# Patient Record
Sex: MALE | Race: BLACK OR AFRICAN AMERICAN | Employment: FULL TIME | URBAN - METROPOLITAN AREA
[De-identification: names, ages, dates, MRNs, and addresses within clinical notes are randomized per-mention and may not be internally consistent; named-entity substitution may affect disease eponyms.]

---

## 2021-02-22 ENCOUNTER — HOSPITAL ENCOUNTER (OUTPATIENT)
Dept: PHYSICAL THERAPY | Age: 41
Discharge: HOME | End: 2021-02-22
Payer: COMMERCIAL

## 2021-02-22 PROCEDURE — 97162 PT EVAL MOD COMPLEX 30 MIN: CPT | Performed by: PHYSICAL THERAPIST

## 2021-02-22 PROCEDURE — 97530 THERAPEUTIC ACTIVITIES: CPT | Performed by: PHYSICAL THERAPIST

## 2021-02-22 PROCEDURE — 97110 THERAPEUTIC EXERCISES: CPT | Performed by: PHYSICAL THERAPIST

## 2021-02-22 NOTE — PROGRESS NOTES
[facility]  [address] [facility], [address] [facility], [address] [address],[place] [address]  [phone] fax      Plan of Care/ Statement of Necessity for Physical Therapy Services    Patient name: Cesario Dancer Start of Care: 2021   Referral source: LILLIAM Cole : 1980    Medical Diagnosis: Pain in right knee [M25.561]  Payor: BLUE CROSS / Plan:  [facility] / Product Type: PPO /  Onset Date: 2020    Treatment Diagnosis: R knee pain   Prior Hospitalization: see medical history Provider#: [id number]   Medications: Verified on Patient summary List    Comorbidities: elevated BMI, HBP, history of pre-diabetic   Prior Level of Function: I with ADLs, walking for exercise and at work as much as desired, sitting for long periods, driving without difficulty     The Plan of Care and following information is based on the information from the initial evaluation. Assessment/ key information: Patient is a pleasant younger adult male with sub-acute onset of R knee subluxation and pain. Pain appears primarily nociceptive in nature and based on special testing is likely patella-femoral syndrome vs. Meniscal injury. Treatment will focus on graded activity exposure with progression to proper squat and dead lift form as well as floor transfers to facilitate pain free functional lifting, walking, and running as desired.    Evaluation Complexity History MEDIUM  Complexity : 1-2 comorbidities / personal factors will impact the outcome/ POC ; Examination LOW Complexity : 1-2 Standardized tests and measures addressing body structure, function, activity limitation and / or participation in recreation  ;Presentation LOW Complexity : Stable, uncomplicated  ;Clinical Decision Making MEDIUM Complexity : FOTO score of 26-74  Overall Complexity Rating: MEDIUM  Problem List: pain affecting function, decrease ROM, decrease strength, edema affecting function, impaired gait/ balance, decrease ADL/ functional abilitiies, decrease activity tolerance, decrease flexibility/ joint mobility and decrease transfer abilities   Treatment Plan may include any combination of the following: Therapeutic exercise, Therapeutic activities, Neuromuscular re-education, Physical agent/modality, Gait/balance training, Manual therapy, Patient education, Self Care training, Functional mobility training, Home safety training, Stair training and Other: PNE, HEP  Patient / Family readiness to learn indicated by: asking questions and trying to perform skills  Persons(s) to be included in education: patient (P)  Barriers to Learning/Limitations: None  Patient Goal (s): I want to be able to get up from the floor in a kneeling position without pain to play with my daughter.   Patient Self Reported Health Status: good  Rehabilitation Potential: good    Short Term Goals: To be accomplished in 4 weeks:  1. I with HEP   Eval: established  2. Patient will be able to perform a modified lunge 10 times without onset of symptoms. Eval: unable  3. Patient will be able to perform a full squat without symptoms onset 5 times   Eval: unable  4. Patient will be able to SLS on R LE for 30 secs or greater. Eval: 15 secs  Long Term Goals: To be accomplished in 8 weeks:  1. Patient will achieve predicted FOTO score of 89   Eval: 84  2. Patient will be able to squat and lift 85# from the ground 5 times without symptoms onset to facilitate lifting daughter. Eval: unable  3. Patient will be able to transfer to and from the ground without external assistance 3 times in a row without symptom onset. Eval: unable without pain    Frequency / Duration: Patient to be seen 1 times per week for 8 weeks.     Patient/ Caregiver education and instruction: Diagnosis, prognosis, self care, activity modification, exercises and other PNE, HEP   [x]  Plan of care has been reviewed with BALAJI Ha, PT 2/22/2021 9:15 AM  ________________________________________________________________________    I certify that the above Therapy Services are being furnished while the patient is under my care. I agree with the treatment plan and certify that this therapy is necessary.     [de-identified] Signature:____________Date:_________TIME:________     LILLIAM Ibanez  ** Signature, Date and Time must be completed for valid certification **      Please sign and return to [facility] [address] [place] [address], [address] [place] [address],[facility] [address]  [phone] fax

## 2021-02-22 NOTE — PROGRESS NOTES
PT DAILY TREATMENT NOTE     Patient Name: Mj Mishra  Date:2021  : 1980  [x]  Patient  Verified  Payor: BLUE CROSS / Plan: [facility] [place] / Product Type: PPO /    In time:9:10A  Out time:9:50A  Total Treatment Time (min): 40min  Visit #: 1 of 8    Medicare/BCBS Only   Total Timed Codes (min):  30 1:1 Treatment Time:  30       Treatment Area: Pain in right knee [M25.561]    SUBJECTIVE  Pain Level (0-10 scale): 2/10  Any medication changes, allergies to medications, adverse drug reactions, diagnosis change, or new procedure performed?: [x] No    [] Yes (see summary sheet for update)  Subjective functional status/changes:   [] No changes reported  Lives in 2 story home with 10 steps to second floor with one handrail; no BOBBI the home. Aggravating factors: sitting in chair with knee bent for long period of times, getting down onto floor in kneeling position, squatting down to get to his dryer, climbing stairs; popping periodically    Eases: Rest, straightening the knee, walking    Used to run on his treadmill a lot; walking around the neighborhood and walking on his first and second job. Spontaneous/sharp pain through knee but has gotten progressively better since January    OBJECTIVE    15 min [x]Eval                  []Re-Eval       15 min Therapeutic Exercise:  [x] See flow sheet :   Rationale: increase ROM and increase strength to improve the patients ability to A/ROM and decrease pain with movement. 15 min Therapeutic Activity:  [x]  See flow sheet :   Rationale: increase strength and improve coordination  to improve the patients ability to Tolerate basic ADLs and job-related tasks without pain.             With   [x] TE   [x] TA   [] neuro   [] other: Patient Education: [x] Review HEP    [x] Progressed/Changed HEP based on:   [x] positioning   [] body mechanics   [] transfers   [] heat/ice application    [] other:      Other Objective/Functional Measures:   30 Sec Sit to Stand Test: 12 of 10 reps    SLS:   L: 30 secs  R: 15 secs    MMT: 4-/5 throughout B LEs    Reflexes: hyper at 1+  Varus Test: negative bilaterally    Patellar Grind Barbara: positive on R  Valgus Test: negative  Lachman's: negative    Pain Level (0-10 scale) post treatment: 2/10    ASSESSMENT/Changes in Function: see POC    Patient will continue to benefit from skilled PT services to modify and progress therapeutic interventions, address functional mobility deficits, address ROM deficits, address strength deficits, analyze and address soft tissue restrictions, analyze and cue movement patterns, analyze and modify body mechanics/ergonomics, assess and modify postural abnormalities, address imbalance/dizziness and instruct in home and community integration to attain remaining goals. [x]  See Plan of Care  []  See progress note/recertification  []  See Discharge Summary         Progress towards goals / Updated goals:  See POC    PLAN  [x]  Upgrade activities as tolerated     []  Continue plan of care  []  Update interventions per flow sheet       []  Discharge due to:_  []  Other:_      Lakesha Salazar, PT 2/22/2021  9:16 AM    No future appointments.

## 2021-03-01 ENCOUNTER — HOSPITAL ENCOUNTER (OUTPATIENT)
Dept: PHYSICAL THERAPY | Age: 41
Discharge: HOME | End: 2021-03-01
Payer: COMMERCIAL

## 2021-03-01 PROCEDURE — 97530 THERAPEUTIC ACTIVITIES: CPT

## 2021-03-01 PROCEDURE — 97110 THERAPEUTIC EXERCISES: CPT

## 2021-03-01 PROCEDURE — 97112 NEUROMUSCULAR REEDUCATION: CPT

## 2021-03-01 NOTE — PROGRESS NOTES
PT DAILY TREATMENT NOTE     Patient Name: Italo Gonzalez  Date:3/1/2021  : 1980  [x]  Patient  Verified  Payor: BLUE CROSS [facility] / Product Type: PPO /    In time:857  Out time:942  Total Treatment Time (min): 45  Visit #: 2 of 8    Medicare/BCBS Only   Total Timed Codes (min):  45 1:1 Treatment Time:  45       Treatment Area: Pain in right knee [M25.561]    SUBJECTIVE  Pain Level (0-10 scale): 1  Any medication changes, allergies to medications, adverse drug reactions, diagnosis change, or new procedure performed?: [x] No    [] Yes (see summary sheet for update)  Subjective functional status/changes:   [] No changes reported  Patient reports his knee was feeling sore by Saturday but he got some rest over the weekend and it feels better this morning. OBJECTIVE      20 min Therapeutic Exercise:  [x] See flow sheet :   Rationale: increase ROM and increase strength to improve the patients ability to increase activity tolerance    10 min Therapeutic Activity:  [x]  See flow sheet : sit to stands, squatting mechanics   Rationale: increase ROM, increase strength and improve coordination  to improve the patients ability to perform transfers, squatting/lifting, and negotiate stairs with ease     15 min Neuromuscular Re-education:  [x]  See flow sheet : balance, quad re-ed   Rationale: improve coordination, improve balance and increase proprioception  to improve the patients ability to increase standing and walking tolerance with increased stability            With   [x] TE   [x] TA   [] neuro   [] other: Patient Education: [x] Review HEP    [] Progressed/Changed HEP based on:   [x] positioning   [x] body mechanics   [] transfers   [] heat/ice application    [] other:      Other Objective/Functional Measures: initiated exercises per flow sheet     Pain Level (0-10 scale) post treatment: 0    ASSESSMENT/Changes in Function: Initiated exercise program per POC.  Provided instruction and cuing for performance of exercises. Patient reports most difficulty with getting up from a squat position and going down stairs. He demonstrated mild instability in SLS biliaterally (right>left), requiring occasional use of // bars for support. He tolerated all exercises without increase of pain and reported no pain following session. Add weight to squats and sit to stand next session. Patient will continue to benefit from skilled PT services to modify and progress therapeutic interventions, address functional mobility deficits, address ROM deficits, address strength deficits, analyze and address soft tissue restrictions, analyze and cue movement patterns, analyze and modify body mechanics/ergonomics, assess and modify postural abnormalities, address imbalance/dizziness and instruct in home and community integration to attain remaining goals. []  See Plan of Care  []  See progress note/recertification  []  See Discharge Summary         Progress towards goals / Updated goals:  Short Term Goals: To be accomplished in 4 weeks:  1. I with HEP               Eval: established   Current: reports compliance 3/1  2. Patient will be able to perform a modified lunge 10 times without onset of symptoms. Eval: unable  3. Patient will be able to perform a full squat without symptoms onset 5 times               Eval: unable  4. Patient will be able to SLS on R LE for 30 secs or greater. Eval: 15 secs   Current: requires use of parallel bars for support 3/1  Long Term Goals: To be accomplished in 8 weeks:  1. Patient will achieve predicted FOTO score of 89               Eval: 84  2. Patient will be able to squat and lift 85# from the ground 5 times without symptoms onset to facilitate lifting daughter. Eval: unable  3. Patient will be able to transfer to and from the ground without external assistance 3 times in a row without symptom onset.                Eval: unable without pain    PLAN  [x]  Upgrade activities as tolerated     []  Continue plan of care  []  Update interventions per flow sheet       []  Discharge due to:_  []  Other:_      Kareem Coy PTA 3/1/2021  8:13 AM    Future Appointments   Date Time Provider Lauren Gamez   3/1/2021  9:00 AM Dina Warren PTA [facility]   3/8/2021  9:00 AM Soledad Longo, PT [facility]   3/15/2021  9:00 AM Lakesha Salazar, PT [facility]   3/22/2021  9:00 AM Lakesha Salazar, PT [facility]   3/29/2021  9:00 AM Lakesha Salazar, PT [facility]

## 2021-03-08 ENCOUNTER — HOSPITAL ENCOUNTER (OUTPATIENT)
Dept: PHYSICAL THERAPY | Age: 41
Discharge: HOME | End: 2021-03-08
Payer: COMMERCIAL

## 2021-03-08 PROCEDURE — 97530 THERAPEUTIC ACTIVITIES: CPT | Performed by: PHYSICAL THERAPIST

## 2021-03-08 PROCEDURE — 97110 THERAPEUTIC EXERCISES: CPT | Performed by: PHYSICAL THERAPIST

## 2021-03-08 PROCEDURE — 97112 NEUROMUSCULAR REEDUCATION: CPT | Performed by: PHYSICAL THERAPIST

## 2021-03-08 NOTE — PROGRESS NOTES
PT DAILY TREATMENT NOTE     Patient Name: Ita Esquivel  Date:3/8/2021  : 1980  [x]  Patient  Verified  Payor: BLUE CROSS / Plan: [address] [place] [address] [place] / Product Type: PPO /    In time:8:57A  Out time:9:55A  Total Treatment Time (min): 57min  Visit #: 3 of 8    Medicare/BCBS Only   Total Timed Codes (min):  47 1:1 Treatment Time:  45       Treatment Area: Pain in right knee [M25.561]    SUBJECTIVE  Pain Level (0-10 scale): 2/10  Any medication changes, allergies to medications, adverse drug reactions, diagnosis change, or new procedure performed?: [x] No    [] Yes (see summary sheet for update)  Subjective functional status/changes:   [] No changes reported  Patient states the stairs don't hurt as much but his knee is still popping and still having difficulty deep squatting and getting onto the floor. OBJECTIVE    Modality rationale: decrease edema and increase tissue extensibility to improve the patients ability to decrease exercise-induced muscle soreness.    Min Type Additional Details    [] Estim:  []Unatt       []IFC  []Premod                        []Other:  []w/ice   []w/heat  Position:  Location:    [] Estim: []Att    []TENS instruct  []NMES                    []Other:  []w/US   []w/ice   []w/heat  Position:  Location:    []  Traction: [] Cervical       []Lumbar                       [] Prone          []Supine                       []Intermittent   []Continuous Lbs:  [] before manual  [] after manual    []  Ultrasound: []Continuous   [] Pulsed                           []1MHz   []3MHz W/cm2:  Location:    []  Iontophoresis with dexamethasone         Location: [] Take home patch   [] In clinic   10 [x]  Ice     []  heat  []  Ice massage  []  Laser   []  Anodyne Position: long sit  Location: R knee    []  Laser with stim  []  Other:  Position:  Location:    []  Vasopneumatic Device Pressure:       [] lo [] med [] hi   Temperature: [] lo [] med [] hi   [] Skin assessment post-treatment: []intact []redness- no adverse reaction    []redness  adverse reaction:     15 min Therapeutic Exercise:  [x] See flow sheet :   Rationale: increase ROM and increase strength to improve the patients ability to A/ROM and decrease pain with movement. 16 min Therapeutic Activity:  [x]  See flow sheet :   Rationale: increase strength and improve coordination  to improve the patients ability to Tolerate basic ADLs and job-related tasks without pain. 16 min Neuromuscular Re-education:  [x]  See flow sheet :   Rationale: improve coordination, improve balance and increase proprioception  to improve the patients ability to perform activities with good form and proprioception with tactile and verbal cuing appropriately. With   [x] TE   [x] TA   [x] neuro   [] other: Patient Education: [x] Review HEP    [x] Progressed/Changed HEP based on:   [x] positioning   [] body mechanics   [] transfers   [] heat/ice application    [] other:      Other Objective/Functional Measures: positive squat test with elevated heels in the bottom     Pain Level (0-10 scale) post treatment: 2/10    ASSESSMENT/Changes in Function: Patient is progressing towards goals of increased activity tolerance and decreased pan with functional activities such as stairs. Continues to need verbal and tactile cuing for proper form and positioning especially with squats and lunges for being able to get on and off the floor. Patient will continue to benefit from skilled PT services to modify and progress therapeutic interventions, address functional mobility deficits, address ROM deficits, address strength deficits, analyze and address soft tissue restrictions, analyze and cue movement patterns, analyze and modify body mechanics/ergonomics, assess and modify postural abnormalities, address imbalance/dizziness and instruct in home and community integration to attain remaining goals.      [x]  See Plan of Care  []  See progress note/recertification  [] See Discharge Summary         Progress towards goals / Updated goals:  Short Term Goals: To be accomplished in 4 weeks:  1. I with HEP               Eval: established              Current: reports compliance 3/8/21  2. Patient will be able to perform a modified lunge 10 times without onset of symptoms.              Eval: unable   Current: MET 3/8/21  3. Patient will be able to perform a full squat without symptoms onset 5 times               Eval: unable   Current: able to do 3 before pain 3/8/21  4. Patient will be able to SLS on R LE for 30 secs or greater.               Eval: 15 secs              Current: requires use of parallel bars for support 3/1  Long Term Goals: To be accomplished in 8 weeks:  1. Patient will achieve predicted FOTO score of 89               Eval: 84  2. Patient will be able to squat and lift 85# from the ground 5 times without symptoms onset to facilitate lifting daughter.  Arthur Ramone: unable  3.  Patient will be able to transfer to and from the ground without external assistance 3 times in a row without symptom onset.               Eval: unable without pain    PLAN  [x]  Upgrade activities as tolerated     []  Continue plan of care  []  Update interventions per flow sheet       []  Discharge due to:_  []  Other:_      Karie Lacy, PT 3/8/2021  9:08 AM    Future Appointments   Date Time Provider Lauren Gamez   3/15/2021  9:00 AM Eladio Grey PT [facility]   3/22/2021  9:00 AM Lakesha Salazar PT [facility]   3/29/2021  9:00 AM Lakesha Salazar PT [facility]

## 2021-03-15 ENCOUNTER — HOSPITAL ENCOUNTER (OUTPATIENT)
Dept: PHYSICAL THERAPY | Age: 41
Discharge: HOME | End: 2021-03-15
Payer: COMMERCIAL

## 2021-03-15 PROCEDURE — 97110 THERAPEUTIC EXERCISES: CPT | Performed by: PHYSICAL THERAPIST

## 2021-03-15 PROCEDURE — 97112 NEUROMUSCULAR REEDUCATION: CPT | Performed by: PHYSICAL THERAPIST

## 2021-03-15 PROCEDURE — 97530 THERAPEUTIC ACTIVITIES: CPT | Performed by: PHYSICAL THERAPIST

## 2021-03-15 NOTE — PROGRESS NOTES
PT DAILY TREATMENT NOTE     Patient Name: Malachi Arroyo  Date:3/15/2021  : 1980  [x]  Patient  Verified  Payor: BLUE CROSS / Plan: [employer] [facility] / Product Type: PPO /    In time:9:00A  Out time:9:54A  Total Treatment Time (min): 54  Visit #: 4 of 8    Medicare/BCBS Only   Total Timed Codes (min):  54 1:1 Treatment Time:  48       Treatment Area: Pain in right knee [M25.561]    SUBJECTIVE  Pain Level (0-10 scale): 0/10  Any medication changes, allergies to medications, adverse drug reactions, diagnosis change, or new procedure performed?: [x] No    [] Yes (see summary sheet for update)  Subjective functional status/changes:   [] No changes reported  Patient states feeling 80% better, able to get down and move around on knees without popping or pain, although was very sore for 2 days after last session. Also noting that he doesn't need to mess with R knee cap for pain relief nearly as much - now its 2x/day vs. Every hour on the job. OBJECTIVE         Modality rationale: decrease edema and increase tissue extensibility to improve the patients ability to decrease exercise-induced muscle soreness. Min Type Additional Details      []? Estim:  []? Unatt       []? IFC  []? Premod                        []?Other:  []?w/ice   []?w/heat  Position:  Location:      []? Estim: []? Att    []? TENS instruct  []? NMES                    []?Other:  []?w/US   []?w/ice   []?w/heat  Position:  Location:      []? Traction: []? Cervical       []? Lumbar                       []? Prone          []? Supine                       []?Intermittent   []? Continuous Lbs:  []? before manual  []? after manual      []? Ultrasound: []? Continuous   []? Pulsed                           []? 1MHz   []? 3MHz W/cm2:  Location:      []? Iontophoresis with dexamethasone         Location: []? Take home patch   []? In clinic    10 [x]? Ice in conjunction with patient education and goal review    []?  heat  []? Ice massage  []?   Laser []?  Anodyne Position: long sit  Location: R knee      []? Laser with stim  []? Other:  Position:  Location:     []? Vasopneumatic Device Pressure:       []? lo []? med []? hi   Temperature: []? lo []? med []? hi    []? Skin assessment post-treatment:  []?intact []? redness- no adverse reaction    []? redness  adverse reaction:      15 min Therapeutic Exercise:  [x]? See flow sheet :   Rationale: increase ROM and increase strength to improve the patients ability to A/ROM and decrease pain with movement.     28 min Therapeutic Activity:  [x]? See flow sheet :   Rationale: increase strength and improve coordination  to improve the patients ability to Tolerate basic ADLs and job-related tasks without pain. also included goal review and patient education. 11 min Neuromuscular Re-education:  [x]? See flow sheet :   Rationale: improve coordination, improve balance and increase proprioception  to improve the patients ability to perform activities with good form and proprioception with tactile and verbal cuing appropriately.     With   [x]? TE   [x]? TA   [x]? neuro   []? other: Patient Education: [x]? Review HEP    [x]? Progressed/Changed HEP based on:   [x]? positioning   []? body mechanics   []? transfers   []? heat/ice application    []? other:       Other Objective/Functional Measures: positive squat test with elevated heels in the bottom         Pain Level (0-10 scale) post treatment: 1/10     ASSESSMENT/Changes in Function: Patient is continuing to make steady progress towards goals of increased activity tolerance and decreased pan with functional activities such as stairs.  Continues to need verbal and tactile cuing for proper form and positioning especially with squats and lunges without pain or popping.      Patient will continue to benefit from skilled PT services to modify and progress therapeutic interventions, address functional mobility deficits, address ROM deficits, address strength deficits, analyze and address soft tissue restrictions, analyze and cue movement patterns, analyze and modify body mechanics/ergonomics, assess and modify postural abnormalities, address imbalance/dizziness and instruct in home and community integration to attain remaining goals. [x]? See Plan of Care  []? See progress note/recertification  []? See Discharge Summary         Progress towards goals / Updated goals:  Short Term Goals: To be accomplished in 4 weeks:  1. I with HEP               Eval: established              NGUXKOS: reports compliance 3/8/21 MET 3/15/21  2. Patient will be able to perform a modified lunge 10 times without onset of symptoms.              Eval: unable              Current: MET 3/8/21  3. Patient will be able to perform a full squat without symptoms onset 5 times               Eval: unable              Current: Able to do full squats 10 times 3/15/21  4. Patient will be able to SLS on R LE for 30 secs or greater.               Eval: 15 secs              Current: requires use of parallel bars for support 3/1  Long Term Goals: To be accomplished in 8 weeks:  1. Patient will achieve predicted FOTO score of 89               Eval: 84  2. Patient will be able to squat and lift 85# from the ground 5 times without symptoms onset to facilitate lifting daughterRina Antonio: unable  3. Patient will be able to transfer to and from the ground without external assistance 3 times in a row without symptom onset.               Eval: unable without pain   Current: MET 3/15/21     PLAN  [x]? Upgrade activities as tolerated     []? Continue plan of care  []? Update interventions per flow sheet       []? Discharge due to:_  []?   Other:_       Eda Jeans, PT 3/15/2021  9:15 AM    Future Appointments   Date Time Provider Lauren Gamez   3/22/2021  9:00 AM [facility]   3/29/2021  9:00 AM Lakesha Salazar, PT [facility]

## 2021-03-22 ENCOUNTER — HOSPITAL ENCOUNTER (OUTPATIENT)
Dept: PHYSICAL THERAPY | Age: 41
Discharge: HOME | End: 2021-03-22
Payer: COMMERCIAL

## 2021-03-22 PROCEDURE — 97530 THERAPEUTIC ACTIVITIES: CPT | Performed by: PHYSICAL THERAPIST

## 2021-03-22 PROCEDURE — 97110 THERAPEUTIC EXERCISES: CPT | Performed by: PHYSICAL THERAPIST

## 2021-03-22 PROCEDURE — 97112 NEUROMUSCULAR REEDUCATION: CPT | Performed by: PHYSICAL THERAPIST

## 2021-03-22 NOTE — PROGRESS NOTES
PT DAILY TREATMENT NOTE     Patient Name: Ita Esquivel  Date:3/22/2021  : 1980  [x]  Patient  Verified  Payor: BLUE CROSS / Plan: [facility] / Product Type: PPO /    In time:9:00A  Out time:9:47A  Total Treatment Time (min): 47min  Visit #: 5 of 8    Medicare/BCBS Only   Total Timed Codes (min):  47 1:1 Treatment Time:  40       Treatment Area: Pain in right knee [M25.561]    SUBJECTIVE  Pain Level (0-10 scale): 1/10  Any medication changes, allergies to medications, adverse drug reactions, diagnosis change, or new procedure performed?: [x] No    [] Yes (see summary sheet for update)  Subjective functional status/changes:   [] No changes reported  Patient states his daughter jumped on his R knee the day before yesterday and it's still smarting and popping a little bit since then, however the pain associated with that activity is not nearly as bad as before therapy. OBJECTIVE  17 min Therapeutic Exercise:  [x]? ? See flow sheet :   Rationale: increase ROM and increase strength to improve the patients ability to A/ROM and decrease pain with movement.     12 min Therapeutic Activity:  [x]? ?  See flow sheet :   Rationale: increase strength and improve coordination  to improve the patients ability to Tolerate basic ADLs and job-related tasks without pain. also included goal review and patient education.    18 min Neuromuscular Re-education:  [x]? ?  See flow sheet :   Rationale: improve coordination, improve balance and increase proprioception  to improve the patients ability to perform activities with good form and proprioception with tactile and verbal cuing appropriately.     With   [x]? ? TE   [x]? ? TA   [x]? ? neuro   []?? other: Patient Education: [x]? ? Review HEP    [x]? ? Progressed/Changed HEP based on:   [x]? ? positioning   []? ? body mechanics   []? ? transfers   []? ? heat/ice application    []? ? other:       Other Objective/Functional Measures:   Negative squat test for one repetition.     Pain Level (0-10 scale) post treatment: 1/10     ASSESSMENT/Changes in Function: Patient is continuing to make steady progress towards goals of increased activity tolerance and decreased pan with functional activities such as stairs. Continues to need verbal and tactile cuing for proper form and positioning especially with squats and lunges without pain or popping.      Patient will continue to benefit from skilled PT services to modify and progress therapeutic interventions, address functional mobility deficits, address ROM deficits, address strength deficits, analyze and address soft tissue restrictions, analyze and cue movement patterns, analyze and modify body mechanics/ergonomics, assess and modify postural abnormalities, address imbalance/dizziness and instruct in home and community integration to attain remaining goals.     [x]? ?  See Plan of Care  []? ?  See progress note/recertification  []? ?  See Discharge Summary         Progress towards goals / Updated goals:  Short Term Goals: To be accomplished in 4 weeks:  1. I with HEP               Eval: established              AOFPQGC: reports compliance 3/8/21 MET 3/15/21  2. Patient will be able to perform a modified lunge 10 times without onset of symptoms.              Eval: unable              OXREANB: MET 3/8/21  3. Patient will be able to perform a full squat without symptoms onset 5 times               Eval: unable              Current: Able to do full squats 10 times 3/15/21 MET 3/22/21  4. Patient will be able to SLS on R LE for 30 secs or greater.               Eval: 15 secs              Current: MET 3/22/21  Long Term Goals: To be accomplished in 8 weeks:  1. Patient will achieve predicted FOTO score of 89               Eval: 84              PN: to be assessed next visit 3/22/21  2.  Patient will be able to squat and lift 85# from the ground 5 times without symptoms onset to facilitate lifting daughter.  Stockton Dunnings: unable PN: not yet tested 3/22/21  3.  Patient will be able to transfer to and from the ground without external assistance 3 times in a row without symptom onset.               Eval: unable without pain              Current: MET 3/15/21     PLAN  [x]  Upgrade activities as tolerated     []  Continue plan of care  []  Update interventions per flow sheet       []  Discharge due to:_  []  Other:_      Galina Mcmanus, PT 3/22/2021  9:57 AM    Future Appointments   Date Time Provider Lauren Gamez   3/29/2021  9:00 AM Alonzo Salazar, PT [facility]

## 2021-03-22 NOTE — PROGRESS NOTES
[facility] [address]  [address] [facility], [address] [facility], [address] [address],[place] [phone] fax    Physician Update  [x] Progress Note  [] Discharge Summary  Patient name: Susan Boyer Start of Care: 2021   Referral source: LILLIAM Vargas : 1980                Medical Diagnosis: Pain in right knee [M25.561]  Payor: BLUE CROSS / Plan: [facility] [employer] / Product Type: PPO /  Onset Date: 2020                Treatment Diagnosis: R knee pain   Prior Hospitalization: see medical history Provider#: [id number]   Medications: Verified on Patient summary List    Comorbidities: elevated BMI, HBP, history of pre-diabetic   Prior Level of Function: I with ADLs, walking for exercise and at work as much as desired, sitting for long periods, driving without difficulty       Visits from Start of Care: 5    Missed Visits: 0    Status at Evaluation/Last Progress Note: Patient is a pleasant younger adult male with sub-acute onset of R knee subluxation and pain. Pain appears primarily nociceptive in nature and based on special testing is likely patella-femoral syndrome vs. Meniscal injury. Treatment will focus on graded activity exposure with progression to proper squat and dead lift form as well as floor transfers to facilitate pain free functional lifting, walking, and running as desired. Progress towards Goals:   Short Term Goals: To be accomplished in 4 weeks:  1. I with HEP               Eval: established              IZXGOXM: reports compliance 3/8/21 MET 3/15/21  2. Patient will be able to perform a modified lunge 10 times without onset of symptoms.              Eval: unable              BSLSQVP: MET 3/8/21  3. Patient will be able to perform a full squat without symptoms onset 5 times               Eval: unable              Current: Able to do full squats 10 times 3/15/21 MET 3/22/21  4.  Patient will be able to SLS on R LE for 30 secs or greater.               Eval: 15 secs              Current: MET 3/22/21  Long Term Goals: To be accomplished in 8 weeks:  1. Patient will achieve predicted FOTO score of 89               Eval: 84   PN: to be assessed next visit 3/22/21  2. Patient will be able to squat and lift 85# from the ground 5 times without symptoms onset to facilitate lifting daughter.  Saloni Fruits: unable   PN: not yet tested 3/22/21  3. Patient will be able to transfer to and from the ground without external assistance 3 times in a row without symptom onset.               Eval: unable without pain              Current: MET 3/15/21    Goals: to be achieved in 4 weeks:  1. Patient will achieve predicted FOTO score of 89               Eval: 84   PN: to be assessed next visit 3/22/21  2. Patient will be able to squat and lift 85# from the ground 5 times without symptoms onset to facilitate lifting daughter.  Saloni Fruits: unable   PN: not yet tested 3/22/21  3. Patient will report ability to perform deep squat/stoop position 5 times in a row without increased symptoms. PN: 3 times with symptoms.  3/22/21    ASSESSMENT/RECOMMENDATIONS:  [x]Continue therapy per initial plan/protocol at a frequency of  1-2 x per week for 5 weeks  []Continue therapy with the following recommended changes:_____________________      _____________________________________________________________________  []Discontinue therapy progressing towards or have reached established goals  []Discontinue therapy due to lack of appreciable progress towards goals  []Discontinue therapy due to lack of attendance or compliance  []Await Physician's recommendations/decisions regarding therapy  []Other:________________________________________________________________    Thank you for this referral. Susan Salazar, PT 3/22/2021 9:52 AM  NOTE TO PHYSICIAN:  PLEASE COMPLETE THE ORDERS BELOW AND   [facility] [phone]  If you are unable to process this request in 24 hours please contact our office: [phone]    ? I have read the above report and request that my patient continue as recommended. ? I have read the above report and request that my patient continue therapy with the following changes/special instructions:_____________________________________  ? I have read the above report and request that my patient be discharged from therapy.     [de-identified] Signature:____________Date:_________TIME:________     LILLIAM Nur  ** Signature, Date and Time must be completed for valid certification **

## 2021-03-29 ENCOUNTER — HOSPITAL ENCOUNTER (OUTPATIENT)
Dept: PHYSICAL THERAPY | Age: 41
Discharge: HOME | End: 2021-03-29
Payer: COMMERCIAL

## 2021-03-29 PROCEDURE — 97112 NEUROMUSCULAR REEDUCATION: CPT | Performed by: PHYSICAL THERAPIST

## 2021-03-29 PROCEDURE — 97530 THERAPEUTIC ACTIVITIES: CPT | Performed by: PHYSICAL THERAPIST

## 2021-03-29 PROCEDURE — 97110 THERAPEUTIC EXERCISES: CPT | Performed by: PHYSICAL THERAPIST

## 2021-03-29 NOTE — PROGRESS NOTES
Physical Therapy Discharge Instructions      [facility] [address], [address] [place] [address], [address] [place], [place] [address] [address] [address]  [phone] fax    Patient: Marques Zambrano  : 1980      Continue Home Exercise Program 1-2 times per day for 4 weeks, then decrease to 3-4 times per week      Continue with    [x] Ice  as needed 1 times per day     [] Heat           Follow up with MD:     [] Upon completion of therapy     [] As needed      Recommendations:     [x]   Return to activity with home program    []   Return to activity with the following modifications:       []Post Rehab Program    []Join Independent aquatic program     [x]Return to/join local gym    Additional Comments: Be consistent with whatever you choose to do.      Lakesha Salazar, PT 3/29/2021 9:45 AM

## 2021-03-29 NOTE — PROGRESS NOTES
PT DISCHARGE DAILY NOTE AND [id number]  Patient name: Zeeshan Ho Start of Care: 2021   Referral source: Colin Hampton PA [facility]                Medical Diagnosis: Pain in right knee [M25.561]  Payor: [employer] / Plan: [employer] / Product Type: PPO /  Onset Date: 2020                Treatment Diagnosis: R knee pain   Prior Hospitalization: see medical history Provider#: [id number]   Medications: Verified on Patient summary List    Comorbidities: elevated BMI, HBP, history of pre-diabetic   Prior Level of Function: I with ADLs, walking for exercise and at work as much as desired, sitting for long periods, driving without difficulty       Visits from Start of Care: 6    Missed Visits: 2    Reporting Period : 3/22/21 to 3/29/21    Date:3/29/2021  : 1980  [x]  Patient  Verified  Payor: BLUE CROSS / Plan: [employer] / Product Type: PPO /    In time:9:03  Out time:9:48  Total Treatment Time (min): 45  Visit #: 1 of 10    Medicare/BCBS Only   Total Timed Codes (min):  45 1:1 Treatment Time:  45       SUBJECTIVE  Pain Level (0-10 scale): 0-1/10  Any medication changes, allergies to medications, adverse drug reactions, diagnosis change, or new procedure performed?: [x] No    [] Yes (see summary sheet for update)  Subjective functional status/changes:   [] No changes reported  Patient states he is feeling pretty good with his knee mobility and stability. Some stiffness in his hip only when laying on it or trying to lift his legs to put on his pants; much better the last two days than it has been. OBJECTIVE  17 min Therapeutic Exercise:  [x]? ?? See flow sheet :   Rationale: increase ROM and increase strength to improve the patients ability to A/ROM and decrease pain with movement.     10 min Therapeutic Activity:  [x]? ??  See flow sheet :   Rationale: increase strength and improve coordination  to improve the patients ability to Tolerate basic ADLs and job-related tasks without pain. also included goal review and patient education.      18 min Neuromuscular Re-education:  [x]? ??  See flow sheet :   Rationale: improve coordination, improve balance and increase proprioception  to improve the patients ability to perform activities with good form and proprioception with tactile and verbal cuing appropriately.     With   [x]? ?? TE   [x]? ?? TA   [x]? ?? neuro   []? ?? other: Patient Education: [x]??? Review HEP    [x]? ?? Progressed/Changed HEP based on:   [x]? ?? positioning   []? ?? body mechanics   []? ?? transfers   []? ?? heat/ice application    []? ?? other:       Other Objective/Functional Measures:   Negative squat test for 10repetitions. Pain Level (0-10 scale) post treatment: 0/10    Summary of Care:  Short Term Goals: To be accomplished in 4 weeks:  1. I with HEP               Eval: established              LQVJPSQ: reports compliance 3/8/21 MET 3/15/21  2. Patient will be able to perform a modified lunge 10 times without onset of symptoms.              Eval: unable              TZLRGAS: MET 3/8/21  3. Patient will be able to perform a full squat without symptoms onset 5 times               Eval: unable              Current: Able to do full squats 10 times 3/15/21 MET 3/22/21  4. Patient will be able to SLS on R LE for 30 secs or greater.               Eval: 15 secs              Current: MET 3/22/21  Long Term Goals: To be accomplished in 8 weeks:  1. Patient will achieve predicted FOTO score of 89               Eval: 84              PN: to be assessed next visit 3/22/21   D/C: 99 3/29/21 MET  2. Patient will be able to squat and lift 85# from the ground 5 times without symptoms onset to facilitate lifting daughterRina  Phineas Cooks: unable              PN: not yet tested 3/22/21   D/C: able to perform with 100# 5 times 3/29/21 MET  3.  Patient will be able to transfer to and from the ground without external assistance 3 times in a row without symptom onset.               Eval: unable without pain              Current: MET 3/15/21    ASSESSMENT/Changes in Function: Patient has met or exceeded all goals as set at evaluation. He was educated on appropriate exercise progression and prevention of future injury as well as gains made in therapy.      Thank you for this referral!      PLAN  [x]Discontinue therapy: [x]Patient has reached or is progressing toward set goals       []Patient is non-compliant or has abdicated      []Due to lack of appreciable progress towards set goals    Cooper Salazar, PT 3/29/2021  10:27 AM

## 2021-04-05 ENCOUNTER — APPOINTMENT (OUTPATIENT)
Dept: PHYSICAL THERAPY | Age: 41
End: 2021-04-05